# Patient Record
Sex: FEMALE | Race: WHITE | Employment: UNEMPLOYED | URBAN - METROPOLITAN AREA
[De-identification: names, ages, dates, MRNs, and addresses within clinical notes are randomized per-mention and may not be internally consistent; named-entity substitution may affect disease eponyms.]

---

## 2020-05-26 ENCOUNTER — MEDICAL CORRESPONDENCE (OUTPATIENT)
Dept: HEALTH INFORMATION MANAGEMENT | Facility: CLINIC | Age: 20
End: 2020-05-26

## 2020-05-26 ENCOUNTER — TRANSFERRED RECORDS (OUTPATIENT)
Dept: HEALTH INFORMATION MANAGEMENT | Facility: CLINIC | Age: 20
End: 2020-05-26

## 2020-05-27 ENCOUNTER — TRANSCRIBE ORDERS (OUTPATIENT)
Dept: MATERNAL FETAL MEDICINE | Facility: CLINIC | Age: 20
End: 2020-05-27

## 2020-05-27 DIAGNOSIS — O26.90 PREGNANCY RELATED CONDITION: Primary | ICD-10-CM

## 2020-06-01 ENCOUNTER — APPOINTMENT (OUTPATIENT)
Dept: INTERPRETER SERVICES | Facility: CLINIC | Age: 20
End: 2020-06-01

## 2020-06-05 ENCOUNTER — PRE VISIT (OUTPATIENT)
Dept: MATERNAL FETAL MEDICINE | Facility: CLINIC | Age: 20
End: 2020-06-05

## 2020-06-09 ENCOUNTER — VIRTUAL VISIT (OUTPATIENT)
Dept: MATERNAL FETAL MEDICINE | Facility: CLINIC | Age: 20
End: 2020-06-09
Attending: PHYSICIAN ASSISTANT

## 2020-06-09 DIAGNOSIS — O26.90 PREGNANCY RELATED CONDITION: ICD-10-CM

## 2020-06-09 DIAGNOSIS — Z82.79 FAMILY HISTORY OF BIRTH DEFECTS: Primary | ICD-10-CM

## 2020-06-09 PROCEDURE — 96040 ZZH GENETIC COUNSELING, EACH 30 MINUTES: CPT | Mod: 95,ZF | Performed by: GENETIC COUNSELOR, MS

## 2020-06-09 NOTE — PROGRESS NOTES
[facility] Fetal Medicine [place]  Genetic Counseling Consult    Patient:  Franci Harley YOB: 2000   Date of Service:  20        Franci was evaluated via a billable telephone visit at [facility] for genetic consultation given prior pregnancy with congenital heart defect    The patient has been notified of the following:  This telephone visit will be conducted via a call between you and your physician/provider. We have found that certain health care needs can be provided without the need for a physical exam. This service lets us provide the care you need with a short phone conversation. If a prescription is necessary we can send it directly to your pharmacy. If lab work is needed we can place an order for that and you can then stop by our lab to have the test done at a later time.     If during the course of the call the provider feels a telephone visit is not appropriate, you will not be charged for this service.     Today's call was facilitated by a [place]  from [employer]  services.          Impression/Plan:   1. Franci has not had serum screening in this pregnancy.     2. Franci has a fetal echocardiogram scheduled for  to assess fetal heart structures.  Please see the ultrasound report for further details when available.    3. The patient declines genetic amniocentesis and maternal serum screening today.    Pregnancy History:   /Parity:    Age at Delivery: 20 year old  RYLAN: 10/10/2020, by Ultrasound  Gestational Age: 22w3d    No significant complications or exposures were reported in the current pregnancy.    Franci s pregnancy history is significant for 2 prior full term pregnancies.  This is Franci's second pregnancy with her partner.  Franci's most recent pregnancy was found prenatally to have a congenital heart defect.  Her son required corrective surgery at DoL 3.  He is otherwise healthy and  developmentally on track.    Medical History:   Franci s reported medical history is not expected to impact pregnancy management or risks to fetal development.       Family History:   A three-generation pedigree was obtained, and is scanned under the  Media  tab.   The following significant findings were reported by Franci:    The majority of today's appointment was spent reviewing her youngest son's congenital heart defect and implications for pregnancy.  We discussed that congenital heart defects can be seen to cluster in families due to complex/multifactorial inheritance, and that a close relative with a CHD increases the probability for other family members to be similarly affected.  We discussed that this pregnancy is considered at increased risk, and that additional ultrasound surveillance, such as Franci's upcoming fetal echocardiogram is recommended.      Otherwise, the reported family history is negative for multiple miscarriages, stillbirths, birth defects, cognitive impairment, known genetic conditions, and consanguinity.       Carrier Screening:       Expanded carrier screening for mutations in a large panel of genes associated with autosomal recessive conditions including cystic fibrosis, spinal muscular atrophy, and others, is now available.      The patient has declined an in depth discussion of available carrier screening options today.       Risk Assessment for Chromosome Conditions:   We explained that the risk for fetal chromosome abnormalities increases with maternal age. We discussed specific features of common chromosome abnormalities, including Down syndrome, trisomy 13, trisomy 18, and sex chromosome trisomies.      - At age 19 at midtrimester, the risk to have a baby with Down syndrome is 1 in 1160.    - At age 19 at midtrimester, the risk to have a baby with any chromosome abnormality is 1 in 580.       Franci did not have maternal serum screening earlier in pregnancy.        Testing Options:    We discussed the following options:   Non-invasive Prenatal Testing (NIPT)    Maternal plasma cell-free DNA testing; first trimester ultrasound with nuchal translucency and nasal bone assessment is recommended, when appropriate    Screens for fetal trisomy 21, trisomy 13, trisomy 18, and sex chromosome aneuploidy    Cannot screen for open neural tube defects; maternal serum AFP after 15 weeks is recommended       Quad screen:     Maternal plasma AFP, hCG, estriol, and inhibin measurement between 15-24 weeks gestation    Provides risk assessment for fetal Down syndrome, trisomy 18, and neural tube defects     Genetic Amniocentesis    Invasive procedure typically performed in the second trimester by which amniotic fluid is obtained for the purpose of chromosome analysis and/or other prenatal genetic analysis    Diagnostic results; >99% sensitivity for fetal chromosome abnormalities    AFAFP measurement tests for open neural tube defects       Comprehensive (Level II) ultrasound: Detailed ultrasound performed between 18-22 weeks gestation to screen for major birth defects and markers for aneuploidy.        We reviewed the benefits and limitations of this testing.  Screening tests provide a risk assessment specific to the pregnancy for certain fetal chromosome abnormalities, but cannot definitively diagnose or exclude a fetal chromosome abnormality.  Follow-up genetic counseling and consideration of diagnostic testing is recommended with any abnormal screening result.     Diagnostic tests carry inherent risks- including risk of miscarriage- that require careful consideration.  These tests can detect fetal chromosome abnormalities with greater than 99% certainty.  Results can be compromised by maternal cell contamination or mosaicism, and are limited by the resolution of cytogenetic G-banding technology.  There is no screening nor diagnostic test that can detect all forms of birth defects or mental disability.    It  was a pleasure to be involved with Franci s care.     Call duration 15 Minutes  Call start 1:35  Call end 1:50    Erasmo Lau MS, [facility]  Licensed Genetic Counselor  Phone: [phone]  Pager: [phone]

## 2020-06-11 ENCOUNTER — APPOINTMENT (OUTPATIENT)
Dept: INTERPRETER SERVICES | Facility: CLINIC | Age: 20
End: 2020-06-11

## 2020-07-14 ENCOUNTER — TRANSCRIBE ORDERS (OUTPATIENT)
Dept: MATERNAL FETAL MEDICINE | Facility: CLINIC | Age: 20
End: 2020-07-14

## 2020-07-14 DIAGNOSIS — O26.90 PREGNANCY RELATED CONDITION: Primary | ICD-10-CM

## 2020-07-15 ENCOUNTER — APPOINTMENT (OUTPATIENT)
Dept: INTERPRETER SERVICES | Facility: CLINIC | Age: 20
End: 2020-07-15

## 2020-07-20 ENCOUNTER — APPOINTMENT (OUTPATIENT)
Dept: INTERPRETER SERVICES | Facility: CLINIC | Age: 20
End: 2020-07-20

## 2020-07-22 ENCOUNTER — APPOINTMENT (OUTPATIENT)
Dept: INTERPRETER SERVICES | Facility: CLINIC | Age: 20
End: 2020-07-22

## 2020-07-23 ENCOUNTER — HOSPITAL ENCOUNTER (OUTPATIENT)
Dept: CARDIOLOGY | Facility: CLINIC | Age: 20
Discharge: HOME | End: 2020-07-23
Attending: PHYSICIAN ASSISTANT | Admitting: PHYSICIAN ASSISTANT

## 2020-07-23 DIAGNOSIS — O26.90 PREGNANCY RELATED CONDITION: ICD-10-CM

## 2020-07-23 PROCEDURE — T1013 SIGN LANG/ORAL INTERPRETER: HCPCS | Mod: U3

## 2020-07-23 PROCEDURE — 93325 DOPPLER ECHO COLOR FLOW MAPG: CPT

## 2020-07-23 NOTE — PROGRESS NOTES
[facility]   Heart Center Fetal Consult Note    Patient:  Franci Harley MRN:  [id number]   YOB: 2000 Age:  19 year old   Date of Visit:  2020 PCP:  No Ref-Primary, Physician     Dear Dr. Ireland:    I had the pleasure of seeing Franci Harley at the [facility] on 2020 in fetal cardiology consultation for fetal echocardiogram results. She presented today accompanied by herself. As you know, she is a 19 year old  at 28w5d who presented for fetal echocardiogram today because of family history of congenital heart disease (previous child with transposition of the great arteries.    I performed and interpreted the fetal echocardiogram today, which demonstrated normal fetal cardiac anatomy. Normal fetal intracardiac connections. Normal right and left ventricular size and function. Fetal heart rate is regular at 147 bpm.    I reviewed the echo findings today with Franci Harley with the assistance of Bel[place]ian phone . She is aware that the study was within normal limits with no major cardiac abnormalities. She is aware of the general limitations of fetal echocardiography. No additional fetal echocardiograms are recommended. No  cardiac follow-up is required.     Thank you for allowing me to participate in Franci's care. Please do not hesitate to contact me with questions or concerns.    This visit was separate from the performance and interpretation of the ultrasound. The majority of the time (>50%) was spent in counseling and coordination of care. I spent approximately 10 minutes in face-to-face time reviewing the above considerations.    Elbert Hernandez M.D.  Pediatric Cardiology  [address], 5th floor, [facility] [address]  Phone [phone]  Fax [phone]

## 2020-09-26 ENCOUNTER — HOSPITAL ENCOUNTER (OUTPATIENT)
Facility: CLINIC | Age: 20
Discharge: HOME | End: 2020-09-26
Attending: OBSTETRICS & GYNECOLOGY | Admitting: OBSTETRICS & GYNECOLOGY
Payer: MEDICAID

## 2020-09-26 ENCOUNTER — HOSPITAL ENCOUNTER (OUTPATIENT)
Facility: CLINIC | Age: 20
End: 2020-09-26
Admitting: OBSTETRICS & GYNECOLOGY
Payer: MEDICAID

## 2020-09-26 VITALS — TEMPERATURE: 98.4 F | RESPIRATION RATE: 16 BRPM

## 2020-09-26 LAB — RUPTURE OF FETAL MEMBRANES BY ROM PLUS: NEGATIVE

## 2020-09-26 PROCEDURE — 59025 FETAL NON-STRESS TEST: CPT

## 2020-09-26 PROCEDURE — 84112 EVAL AMNIOTIC FLUID PROTEIN: CPT | Performed by: OBSTETRICS & GYNECOLOGY

## 2020-09-26 PROCEDURE — G0463 HOSPITAL OUTPT CLINIC VISIT: HCPCS | Mod: 25

## 2020-09-26 NOTE — PLAN OF CARE
Data: [facility] to [facility]: 2020  5:44 PM.  Reason for maternal/fetal assessment is decreased fetal movement, abdominal pain. Patient reports she has had constant lower abdominal pain since around 1100 this morning, patient states it does not feel like contractions, it is sharp and constant pain that is a 5 on a scale of 0-10. Patient states around 1100 when using the bathroom she felt an additional gush of vaginal fluid that was clear, no fluid or leaking since. Patient states that since this time she has felt fetal movement but it has been decreased from what she is used to.  Patient is a .  Prenatal record reviewed. Pregnancy has been uncomplicated..  Gestational Age 38w0d. VSS. Fetal movement present. Patient denies uterine contractions, vaginal bleeding, pelvic pressure, nausea, vomiting, headache, visual disturbances, epigastric or URQ pain, significant edema. Support person is not present.   Action: Verbal consent for EFM. Triage assessment completed with use of  services. SVE 1.5/50/-3, ROM plus collected and sent. Bill of rights reviewed.  Response: Patient verbalized agreement with plan. Dr. Delgado updated regarding admission data. No additional orders, ok to discharge patient to home if lab results are WNL and reactive NST is obtained.

## 2020-09-27 NOTE — PLAN OF CARE
Reactive NST obtained, ROM plus negative. Patient comfortable with discharging to home, educated regarding importance of following up with [facility]/her own providers with any labor or fetal concerns for the remainder of the pregnancy. Patient states understanding, discharged ambulatory.

## 2020-09-27 NOTE — DISCHARGE INSTRUCTIONS
Undelivered Patients Discharge Instructions: Montenegrin    La vieron por: Labor Assessment  Consultamos a: Dr. Danny Sanchez hicieron/recibió (examen o medicamento):Fetal and uterine monitoring, ROM plus     Dieta:   Drink 8 to 12 glasses of liquids (milk, juice, water) every day.  You may eat meals and snacks.     Actividad:  Count fetal kicks everyday (see handout)  Call your doctor or nurse midwife if your baby is moving less than usual.     Llame a castillo proveedor si nota:    Hinchazón en el marquise o aumento de la hinchazón en mirna yanelis o piernas.    Tiffany de liset que no se alivian con Tylenol (acetaminofén).    Cambios en castillo visión (borrosa: ve manchas o estrellas.)    Náuseas (sensación de malestar estomacal) y vómitos (devolver).    Aumento de peso de 5 libras o más por semana.    Acidez estomacal que no se shama.    Signos de infección de vejiga: dolor al orinar (hacer pis), necesidad de ir con más frecuencia y más urgencia.    Se rompe la bolsa (ruptura de membranas) o nota que gotea en castillo ropa interior.    Maksim silvia brillante en castillo ropa interior.    Dolor en la parte baja del vientre (abdomen) o estómago.    Para el primer bebé: Contracciones (tirantez) con menos de 5 minutos de diferencia entre luh y otra por luh hora o más.    Bennie bebé (en adelante): Contracciones (tirantez) con menos de 10 minutos de diferencia entre luh y otra y cada vez más eloisa.    Aumento o cambio en las secreciones vaginales (note el color y la cantidad)    Antes de las 37 semanas, llame si nota los siguientes:    Contracciones que se dan cada 10 minutos o más     Cambios en las secreciones vaginales    Presión pélvica    Dolor de espalda sordo en la región lumbar    Calambres que se sienten emmanuelle luh menstruación    Calambres abdominales con o sin diarrea      Otro: As scheduled in the clinic